# Patient Record
Sex: MALE | Race: WHITE
[De-identification: names, ages, dates, MRNs, and addresses within clinical notes are randomized per-mention and may not be internally consistent; named-entity substitution may affect disease eponyms.]

---

## 2021-10-02 ENCOUNTER — HOSPITAL ENCOUNTER (EMERGENCY)
Dept: HOSPITAL 76 - ED | Age: 26
Discharge: HOME | End: 2021-10-02
Payer: COMMERCIAL

## 2021-10-02 VITALS — DIASTOLIC BLOOD PRESSURE: 74 MMHG | SYSTOLIC BLOOD PRESSURE: 131 MMHG

## 2021-10-02 DIAGNOSIS — R09.89: Primary | ICD-10-CM

## 2021-10-02 PROCEDURE — 99281 EMR DPT VST MAYX REQ PHY/QHP: CPT

## 2021-10-02 NOTE — ED PHYSICIAN DOCUMENTATION
PD HPI HEENT





- Stated complaint


Stated Complaint: THROAT PX





- Chief complaint


Chief Complaint: Heent





- History obtained from


History obtained from: Patient





- Additional information


Additional information: 





While brushing his teeth with a particularly soft toothbrush he felt like a 

bristle got lodged on the left side.  No fevers or other URI symptoms.





Review of Systems


Constitutional: reports: Reviewed and negative


Eyes: reports: Reviewed and negative


Ears: reports: Reviewed and negative


Nose: reports: Reviewed and negative





PD PAST MEDICAL HISTORY





- Allergies


Allergies/Adverse Reactions: 


                                    Allergies











Allergy/AdvReac Type Severity Reaction Status Date / Time


 


No Known Drug Allergies Allergy   Verified 10/02/21 09:30














PD ED PE NORMAL





- Vitals


Vital signs reviewed: Yes





- General


General: Alert and oriented X 3, No acute distress





- HEENT


HEENT: Other (Oropharyngeal examination demonstrates cobblestoning posteriorly 

and very mild redness of the left tonsil without exudates or visible foreign 

body.  Phonation is normal.)





- Neuro


Neuro: Alert and oriented X 3, Normal speech





Results





- Vitals


Vitals: 





                               Vital Signs - 24 hr











  10/02/21





  09:27


 


Temperature 36.9 C


 


Heart Rate 59 L


 


Respiratory 15





Rate 


 


Blood Pressure 131/74 H


 


O2 Saturation 100








                                     Oxygen











O2 Source                      Room air

















Departure





- Departure


Disposition: 01 Home, Self Care


Clinical Impression: 


 Foreign body sensation in throat





Condition: Good


Record reviewed to determine appropriate education?: Yes


Instructions:  ED Foreign Body Esophageal Rslv


Comments: 


As discussed, I suspect the sensation will be gone within the next 24 to 48 

hours.  Return if not better in that timeframe or anytime if worsening.

## 2022-01-08 ENCOUNTER — HOSPITAL ENCOUNTER (OUTPATIENT)
Dept: HOSPITAL 76 - LAB.N | Age: 27
Discharge: HOME | End: 2022-01-08
Attending: PHYSICIAN ASSISTANT
Payer: COMMERCIAL

## 2022-01-08 DIAGNOSIS — Z20.822: ICD-10-CM

## 2022-01-08 DIAGNOSIS — R07.0: Primary | ICD-10-CM
